# Patient Record
Sex: FEMALE | URBAN - METROPOLITAN AREA
[De-identification: names, ages, dates, MRNs, and addresses within clinical notes are randomized per-mention and may not be internally consistent; named-entity substitution may affect disease eponyms.]

---

## 2021-06-03 ENCOUNTER — OFFICE VISIT (OUTPATIENT)
Dept: URBAN - METROPOLITAN AREA CLINIC 48 | Facility: CLINIC | Age: 86
End: 2021-06-03
Payer: COMMERCIAL

## 2021-06-03 DIAGNOSIS — H35.3120 NONEXUDATIVE AGE-RELATED MACULAR DEGENERATION OF LEFT EYE: ICD-10-CM

## 2021-06-03 DIAGNOSIS — H25.811 COMBINED FORMS OF AGE-RELATED CATARACT, RIGHT EYE: Primary | ICD-10-CM

## 2021-06-03 PROCEDURE — 92002 INTRM OPH EXAM NEW PATIENT: CPT | Performed by: STUDENT IN AN ORGANIZED HEALTH CARE EDUCATION/TRAINING PROGRAM

## 2021-06-03 ASSESSMENT — KERATOMETRY
OD: 46.25
OS: 47.38

## 2021-06-03 ASSESSMENT — INTRAOCULAR PRESSURE
OD: 22
OS: 15

## 2021-06-03 NOTE — IMPRESSION/PLAN
Impression: Combined forms of age-related cataract, right eye: H25.811. Plan: The patient has a visually significant cataract in the right eye. After discussion with the patient and careful examination it has been determined that a cataract in the right eye is accounting for a significant amount of patient's visual symptoms. Cataract surgery and the associated risks, benefits, alternatives, expectations, and recovery were discussed in detail with the patient. All questions were answered. The patient understands that there may be some limitation in visual potential given any pre-existing ocular disease. Advised patient we will be using: Dexycu Schedule Cataract Surgery right eye. RL 2, plan for duovisc and possible trypan. 
good dilation, no previous LASIK surgery, no alpha blockers Discussed lens options with patient, patient candidate for standard vs toric lens.

## 2021-06-03 NOTE — IMPRESSION/PLAN
Impression: Nonexudative age-related macular degeneration of left eye: H35.3120. Plan: Discussed dx in detail, likely contributing to as described cloudy vision. Advised to maintain good nutrition/diet. Will continue to monitor along with plan 1.

## 2021-06-14 ENCOUNTER — TESTING ONLY (OUTPATIENT)
Dept: URBAN - METROPOLITAN AREA CLINIC 48 | Facility: CLINIC | Age: 86
End: 2021-06-14
Payer: COMMERCIAL

## 2021-06-14 PROCEDURE — 76519 ECHO EXAM OF EYE: CPT | Performed by: STUDENT IN AN ORGANIZED HEALTH CARE EDUCATION/TRAINING PROGRAM

## 2021-06-14 ASSESSMENT — PACHYMETRY
OD: 2.77
OS: 21.61
OS: 3.73
OD: 21.91

## 2021-06-14 ASSESSMENT — KERATOMETRY
OS: 46.68
OD: 46.98

## 2021-06-17 ENCOUNTER — SURGERY (OUTPATIENT)
Dept: URBAN - METROPOLITAN AREA SURGERY 26 | Facility: SURGERY | Age: 86
End: 2021-06-17
Payer: COMMERCIAL

## 2021-06-17 PROCEDURE — 66982 XCAPSL CTRC RMVL CPLX WO ECP: CPT | Performed by: STUDENT IN AN ORGANIZED HEALTH CARE EDUCATION/TRAINING PROGRAM

## 2021-06-18 ENCOUNTER — POST-OPERATIVE VISIT (OUTPATIENT)
Dept: URBAN - METROPOLITAN AREA CLINIC 48 | Facility: CLINIC | Age: 86
End: 2021-06-18
Payer: COMMERCIAL

## 2021-06-18 PROCEDURE — 99024 POSTOP FOLLOW-UP VISIT: CPT | Performed by: STUDENT IN AN ORGANIZED HEALTH CARE EDUCATION/TRAINING PROGRAM

## 2021-06-18 RX ORDER — BRIMONIDINE TARTRATE 2 MG/ML
0.2 % SOLUTION/ DROPS OPHTHALMIC
Qty: 5 | Refills: 0 | Status: ACTIVE
Start: 2021-06-18

## 2021-06-18 ASSESSMENT — INTRAOCULAR PRESSURE: OD: 29

## 2021-06-18 NOTE — IMPRESSION/PLAN
Impression: S/P Cataract Extraction by phacoemulsification with IOL placement 68568 OD - 1 Day. Presence of intraocular lens  Z96.1. Plan: IOP elevated post CE/IOL Start: Brim BID OD
 - Eye is doing well - Reviewed post op precautions with the patient including no bending past the waist, no heavy lifting, keeping the eye clean and dry, and  drops by at least 3 minutes
- RT/RD and endophthalmitis  precautions reviewed with the patient with strict RTC instructions

## 2021-06-24 ENCOUNTER — POST-OPERATIVE VISIT (OUTPATIENT)
Dept: URBAN - METROPOLITAN AREA CLINIC 48 | Facility: CLINIC | Age: 86
End: 2021-06-24
Payer: COMMERCIAL

## 2021-06-24 PROCEDURE — 99024 POSTOP FOLLOW-UP VISIT: CPT | Performed by: OPTOMETRIST

## 2021-06-24 ASSESSMENT — INTRAOCULAR PRESSURE
OS: 18
OD: 19

## 2021-06-24 NOTE — IMPRESSION/PLAN
Impression: S/P Cataract Extraction by phacoemulsification with IOL placement 75568 OD - 7 Days. Presence of intraocular lens  Z96.1. Excellent post op course Plan: IOP within normal limit today. Discontinue Brimonidine RTC 2 weeks for PO 3

## 2021-07-12 ENCOUNTER — POST-OPERATIVE VISIT (OUTPATIENT)
Dept: URBAN - METROPOLITAN AREA CLINIC 48 | Facility: CLINIC | Age: 86
End: 2021-07-12
Payer: COMMERCIAL

## 2021-07-12 DIAGNOSIS — Z48.810 ENCOUNTER FOR SURGICAL AFTERCARE FOLLOWING SURGERY ON A SENSE ORGAN: Primary | ICD-10-CM

## 2021-07-12 PROCEDURE — 99024 POSTOP FOLLOW-UP VISIT: CPT | Performed by: OPTOMETRIST

## 2021-07-12 ASSESSMENT — INTRAOCULAR PRESSURE
OD: 7
OS: 9

## 2021-07-12 NOTE — IMPRESSION/PLAN
Impression: S/P Cataract Extraction by phacoemulsification with IOL placement 71777 OD - 25 Days. Encounter for surgical aftercare following surgery on a sense organ  Z48.810. Excellent post op course Plan: Discussed with patient.  

RTC 3 months for DFE with Dr. Barbara Parrish

## 2021-10-12 ENCOUNTER — OFFICE VISIT (OUTPATIENT)
Dept: URBAN - METROPOLITAN AREA CLINIC 48 | Facility: CLINIC | Age: 86
End: 2021-10-12
Payer: COMMERCIAL

## 2021-10-12 DIAGNOSIS — H40.013 OPEN ANGLE WITH BORDERLINE FINDINGS, LOW RISK, BILATERAL: ICD-10-CM

## 2021-10-12 DIAGNOSIS — Z96.1 PRESENCE OF INTRAOCULAR LENS: Primary | ICD-10-CM

## 2021-10-12 PROCEDURE — 92014 COMPRE OPH EXAM EST PT 1/>: CPT | Performed by: STUDENT IN AN ORGANIZED HEALTH CARE EDUCATION/TRAINING PROGRAM

## 2021-10-12 ASSESSMENT — INTRAOCULAR PRESSURE
OD: 19
OS: 19

## 2021-10-12 NOTE — IMPRESSION/PLAN
Impression: Nonexudative age-related macular degeneration of left eye: H35.3120. Plan: Stable No IRF/SRF on OCT Amsler grid reviewed with patient COntinue to monitor

## 2021-10-12 NOTE — IMPRESSION/PLAN
Impression: Open angle with borderline findings, low risk, bilateral: H40.013. Plan: Mild rim thinning, denies personal or family history of glaucoma WIll get OCT RNFL next visit Continue to monitor

## 2021-10-12 NOTE — IMPRESSION/PLAN
Impression: Presence of intraocular lens: Z96.1 OD. Plan: Doing excellent Very happy with vision COntinue with annual DFE